# Patient Record
Sex: FEMALE | Race: WHITE | NOT HISPANIC OR LATINO | Employment: FULL TIME | URBAN - METROPOLITAN AREA
[De-identification: names, ages, dates, MRNs, and addresses within clinical notes are randomized per-mention and may not be internally consistent; named-entity substitution may affect disease eponyms.]

---

## 2023-10-04 ENCOUNTER — TREATMENT (OUTPATIENT)
Dept: OCCUPATIONAL THERAPY | Facility: CLINIC | Age: 63
End: 2023-10-04
Payer: COMMERCIAL

## 2023-10-04 DIAGNOSIS — S66.811A: Primary | ICD-10-CM

## 2023-10-04 DIAGNOSIS — M25.641 DECREASED RANGE OF MOTION OF FINGER OF RIGHT HAND: ICD-10-CM

## 2023-10-04 PROCEDURE — 97530 THERAPEUTIC ACTIVITIES: CPT | Mod: GO | Performed by: OCCUPATIONAL THERAPIST

## 2023-10-04 PROCEDURE — 97022 WHIRLPOOL THERAPY: CPT | Mod: GO | Performed by: OCCUPATIONAL THERAPIST

## 2023-10-04 PROCEDURE — 97140 MANUAL THERAPY 1/> REGIONS: CPT | Mod: GO | Performed by: OCCUPATIONAL THERAPIST

## 2023-10-04 ASSESSMENT — PAIN SCALES - GENERAL: PAINLEVEL_OUTOF10: 1

## 2023-10-04 ASSESSMENT — PAIN - FUNCTIONAL ASSESSMENT: PAIN_FUNCTIONAL_ASSESSMENT: 0-10

## 2023-10-04 NOTE — PROGRESS NOTES
Occupational Therapy    Occupational Therapy Treatment    Name: Anni Cifuentes  MRN: [id number]  : 1960  Date: 10/04/23       Assessment:  OT initiated BTE this visit with weakness and fatigue in right hand at completion of strengthening program.  No complaints of increased pain levels.     Plan:  Continue per plan of care.  Patient seeing physician next week for possible return to work determination.     Insurance: [employer]  Number of Allowed Visits: 40 PT/OT  Date Range: calendar year    Visit Number: 8     Subjective   General:  Patient reports she has been icing her hand for pain relief as she has been cleaning more at her house.     General  General Comment: no precautions  Pain Assessment:  Pain Assessment  Pain Assessment: 0-10  Pain Score: 1    TREATMENT:  Modalities 15 min  Fluidotherapy with AROM right hand  Manual Techniques 15 min  Joint traction and stretches to wrist and digits  Therapuetic Activity: 15 min   2 min   2 min   2 min   2 min   2 min    OP EDUCATION:  Educated patient in heat v ice, rest breaks with activity and continued use of extension splint.     Goals:    1. Patient to improve QuickDash score to at or below 20% for increased independence with ADLs.  2. Patient to improve AROM of IV and V digits to 250 deg for increased independence with ADLs.  3. Patient to improve right  strength to 30# to increase use of hand for lifting and carrying tasks.  4. Patient to improve resting max levels to 1/10.

## 2023-10-10 PROBLEM — M21.40 ACQUIRED PES PLANOVALGUS: Status: ACTIVE | Noted: 2023-10-10

## 2023-10-10 PROBLEM — D64.9 ANEMIA: Status: ACTIVE | Noted: 2023-10-10

## 2023-10-10 PROBLEM — E11.9 DIABETES (MULTI): Status: ACTIVE | Noted: 2023-10-10

## 2023-10-10 PROBLEM — M19.90 OSTEOARTHROSIS: Status: ACTIVE | Noted: 2023-10-10

## 2023-10-10 PROBLEM — F32.A DEPRESSION: Status: ACTIVE | Noted: 2023-10-10

## 2023-10-10 PROBLEM — K21.9 ESOPHAGEAL REFLUX: Status: ACTIVE | Noted: 2023-10-10

## 2023-10-10 PROBLEM — F41.1 ANXIETY STATE: Status: ACTIVE | Noted: 2023-10-10

## 2023-10-10 PROBLEM — J45.909 ASTHMA (HHS-HCC): Status: ACTIVE | Noted: 2023-10-10

## 2023-10-10 PROBLEM — I10 HIGH BLOOD PRESSURE: Status: ACTIVE | Noted: 2023-10-10

## 2023-10-10 PROBLEM — E07.9 THYROID DISEASE: Status: ACTIVE | Noted: 2023-10-10

## 2023-10-10 PROBLEM — M19.079 ARTHRITIS OF ANKLE OR FOOT, DEGENERATIVE: Status: ACTIVE | Noted: 2023-10-10

## 2023-10-10 PROBLEM — M19.90 ARTHRITIS: Status: ACTIVE | Noted: 2023-10-10

## 2023-10-10 PROBLEM — D36.9 BENIGN NEOPLASM: Status: ACTIVE | Noted: 2023-10-10

## 2023-10-10 PROBLEM — M12.579 TRAUMATIC ARTHROPATHY OF FOOT: Status: ACTIVE | Noted: 2023-10-10

## 2023-10-10 RX ORDER — FLUTICASONE PROPIONATE AND SALMETEROL 50; 250 UG/1; UG/1
1 POWDER RESPIRATORY (INHALATION) 2 TIMES DAILY
COMMUNITY

## 2023-10-10 RX ORDER — LEVOTHYROXINE SODIUM 100 UG/1
100 TABLET ORAL DAILY
COMMUNITY
Start: 2006-02-23

## 2023-10-10 RX ORDER — ALBUTEROL SULFATE 0.83 MG/ML
SOLUTION RESPIRATORY (INHALATION)
COMMUNITY
Start: 2006-03-09 | End: 2023-10-26 | Stop reason: WASHOUT

## 2023-10-10 RX ORDER — MULTIVITAMIN
1 TABLET ORAL DAILY
COMMUNITY
Start: 2006-02-23 | End: 2023-10-26 | Stop reason: WASHOUT

## 2023-10-10 RX ORDER — POTASSIUM CHLORIDE 750 MG/1
10 TABLET, FILM COATED, EXTENDED RELEASE ORAL 2 TIMES DAILY
COMMUNITY
Start: 2018-07-13 | End: 2023-10-26 | Stop reason: WASHOUT

## 2023-10-10 RX ORDER — FLUOXETINE 20 MG/1
TABLET ORAL
COMMUNITY
Start: 2006-02-23 | End: 2023-10-26 | Stop reason: WASHOUT

## 2023-10-10 RX ORDER — CYST/ALA/Q10/PHOS.SER/DHA/BROC 100-20-50
1 POWDER (GRAM) ORAL EVERY MORNING
COMMUNITY

## 2023-10-10 RX ORDER — OMEPRAZOLE 20 MG/1
20 CAPSULE, DELAYED RELEASE ORAL DAILY
COMMUNITY
Start: 2006-02-23

## 2023-10-10 RX ORDER — FLUTICASONE PROPIONATE 50 MCG
SPRAY, SUSPENSION (ML) NASAL
COMMUNITY
Start: 2006-02-23

## 2023-10-10 RX ORDER — BUPROPION HYDROCHLORIDE 75 MG/1
75 TABLET ORAL 2 TIMES DAILY
COMMUNITY
Start: 2023-06-06

## 2023-10-10 RX ORDER — FLUTICASONE PROPIONATE 220 UG/1
AEROSOL, METERED RESPIRATORY (INHALATION)
COMMUNITY
Start: 2006-02-23 | End: 2023-10-26 | Stop reason: WASHOUT

## 2023-10-10 RX ORDER — FLUOXETINE HYDROCHLORIDE 20 MG/1
20 CAPSULE ORAL EVERY MORNING
COMMUNITY
Start: 2022-07-25

## 2023-10-10 RX ORDER — FERROUS SULFATE 325(65) MG
325 TABLET ORAL
COMMUNITY
End: 2023-10-26 | Stop reason: WASHOUT

## 2023-10-10 RX ORDER — GABAPENTIN 300 MG/1
300 CAPSULE ORAL 2 TIMES DAILY
COMMUNITY
Start: 2019-03-22 | End: 2023-10-26 | Stop reason: WASHOUT

## 2023-10-10 RX ORDER — LORATADINE 10 MG/1
10 TABLET ORAL DAILY PRN
COMMUNITY
Start: 2006-02-23

## 2023-10-10 RX ORDER — LAMOTRIGINE 200 MG/1
200 TABLET ORAL
COMMUNITY
End: 2023-10-26 | Stop reason: WASHOUT

## 2023-10-10 RX ORDER — TRIAMTERENE AND HYDROCHLOROTHIAZIDE 37.5; 25 MG/1; MG/1
CAPSULE ORAL
COMMUNITY
Start: 2006-02-23 | End: 2023-10-26 | Stop reason: WASHOUT

## 2023-10-10 RX ORDER — OLMESARTAN MEDOXOMIL 20 MG/1
20 TABLET ORAL DAILY
COMMUNITY
Start: 2006-02-23 | End: 2023-10-26 | Stop reason: WASHOUT

## 2023-10-10 RX ORDER — ALBUTEROL SULFATE 90 UG/1
1 AEROSOL, METERED RESPIRATORY (INHALATION) DAILY PRN
COMMUNITY
Start: 2022-07-25

## 2023-10-10 RX ORDER — ASCORBIC ACID 500 MG
500 TABLET ORAL EVERY MORNING
COMMUNITY

## 2023-10-10 RX ORDER — LOSARTAN POTASSIUM AND HYDROCHLOROTHIAZIDE 12.5; 1 MG/1; MG/1
1 TABLET ORAL EVERY MORNING
COMMUNITY
Start: 2023-05-04

## 2023-10-11 ENCOUNTER — TREATMENT (OUTPATIENT)
Dept: OCCUPATIONAL THERAPY | Facility: CLINIC | Age: 63
End: 2023-10-11
Payer: COMMERCIAL

## 2023-10-11 DIAGNOSIS — S66.811A: ICD-10-CM

## 2023-10-11 DIAGNOSIS — M25.641 DECREASED RANGE OF MOTION OF FINGER OF RIGHT HAND: Primary | ICD-10-CM

## 2023-10-11 PROCEDURE — 97110 THERAPEUTIC EXERCISES: CPT | Mod: GO | Performed by: OCCUPATIONAL THERAPIST

## 2023-10-11 PROCEDURE — 97140 MANUAL THERAPY 1/> REGIONS: CPT | Mod: GO | Performed by: OCCUPATIONAL THERAPIST

## 2023-10-11 PROCEDURE — 97022 WHIRLPOOL THERAPY: CPT | Mod: GO | Performed by: OCCUPATIONAL THERAPIST

## 2023-10-11 ASSESSMENT — PAIN - FUNCTIONAL ASSESSMENT: PAIN_FUNCTIONAL_ASSESSMENT: 0-10

## 2023-10-11 ASSESSMENT — PAIN SCALES - GENERAL: PAINLEVEL_OUTOF10: 1

## 2023-10-11 NOTE — PROGRESS NOTES
Occupational Therapy    Occupational Therapy Treatment    Name: Anni Cifuentes  MRN: [id number]  : 1960  Date: 10/11/23  Time Calculation  Start Time: 1200  Stop Time: 1240  Time Calculation (min): 40 min    Assessment:  Patient has achieved all established therapy goals and reports minimal difficulty with home management tasks.  She plans to return to work next week.  Patient will be discharged from skilled OT at this time and continue with splint wear at night and strengthening program.   Plan:  Patient to be discharged to HEP and will continue with strengthening HEP.    Insurance: [facility]  Number of Allowed Visits: 40 PT/OT  Date Range:      Visit Number: 9    Subjective   General:  Patient reports she sees Dr. Cruz on Monday and plans to go back to work on Tuesday.  She would like to make today her last therapy visit.   General  General Comment: no precautions  Pain Assessment:  Pain Assessment  Pain Assessment: 0-10  Pain Score: 1    OP EDUCATION:   HEP update to putty strengthening program.    TREATMENT:  Modalities 15 min  Fluidotherapy with AROM right hand  Manual Techniques 10 min  Joint traction and stretches to wrist and digits  Therapuetic Exercise: 15 min  Objective measures of AROM, strength  Green/white putty mix- , pinch and hand abd    GOALS:  1. Patient to improve QuickDash score to at or below 20% for increased independence with ADLs. Goal achieved 10/11/23  2. Patient to improve AROM of IV and V digits to 250 deg for increased independence with ADLs. Goal achieved 10/11/23  3. Patient to improve right  strength to 30# to increase use of hand for lifting and carrying tasks. Goal achieved 10/11/23  4. Patient to improve resting max levels to 1/10. Goal achieved 10/11/23

## 2023-10-18 ENCOUNTER — APPOINTMENT (OUTPATIENT)
Dept: OCCUPATIONAL THERAPY | Facility: CLINIC | Age: 63
End: 2023-10-18
Payer: COMMERCIAL

## 2023-10-27 ENCOUNTER — HOSPITAL ENCOUNTER (OUTPATIENT)
Facility: HOSPITAL | Age: 63
Setting detail: OUTPATIENT SURGERY
Discharge: HOME | End: 2023-10-27
Attending: PODIATRIST | Admitting: PODIATRIST
Payer: COMMERCIAL

## 2023-10-27 VITALS
HEART RATE: 81 BPM | DIASTOLIC BLOOD PRESSURE: 85 MMHG | TEMPERATURE: 97.2 F | RESPIRATION RATE: 17 BRPM | SYSTOLIC BLOOD PRESSURE: 141 MMHG | OXYGEN SATURATION: 99 %

## 2023-10-27 DIAGNOSIS — S82.842A ANKLE FRACTURE, BIMALLEOLAR, CLOSED, LEFT, INITIAL ENCOUNTER: ICD-10-CM

## 2023-10-27 PROCEDURE — 7100000010 HC PHASE TWO TIME - EACH INCREMENTAL 1 MINUTE: Performed by: PODIATRIST

## 2023-10-27 PROCEDURE — 88300 SURGICAL PATH GROSS: CPT | Performed by: PATHOLOGY

## 2023-10-27 PROCEDURE — 88300 SURGICAL PATH GROSS: CPT | Mod: TC | Performed by: PODIATRIST

## 2023-10-27 PROCEDURE — 2500000005 HC RX 250 GENERAL PHARMACY W/O HCPCS: Performed by: PODIATRIST

## 2023-10-27 PROCEDURE — 3600000009 HC OR TIME - EACH INCREMENTAL 1 MINUTE - PROCEDURE LEVEL FOUR: Performed by: PODIATRIST

## 2023-10-27 PROCEDURE — 7100000009 HC PHASE TWO TIME - INITIAL BASE CHARGE: Performed by: PODIATRIST

## 2023-10-27 PROCEDURE — 3600000004 HC OR TIME - INITIAL BASE CHARGE - PROCEDURE LEVEL FOUR: Performed by: PODIATRIST

## 2023-10-27 RX ORDER — SODIUM CHLORIDE, SODIUM LACTATE, POTASSIUM CHLORIDE, CALCIUM CHLORIDE 600; 310; 30; 20 MG/100ML; MG/100ML; MG/100ML; MG/100ML
75 INJECTION, SOLUTION INTRAVENOUS CONTINUOUS
Status: DISCONTINUED | OUTPATIENT
Start: 2023-10-27 | End: 2023-10-27

## 2023-10-27 RX ORDER — BUPIVACAINE HYDROCHLORIDE 5 MG/ML
INJECTION, SOLUTION PERINEURAL AS NEEDED
Status: DISCONTINUED | OUTPATIENT
Start: 2023-10-27 | End: 2023-10-27 | Stop reason: HOSPADM

## 2023-10-27 ASSESSMENT — PAIN SCALES - GENERAL
PAINLEVEL_OUTOF10: 0 - NO PAIN
PAINLEVEL_OUTOF10: 0 - NO PAIN

## 2023-10-27 ASSESSMENT — PAIN - FUNCTIONAL ASSESSMENT
PAIN_FUNCTIONAL_ASSESSMENT: 0-10
PAIN_FUNCTIONAL_ASSESSMENT: 0-10

## 2023-10-27 ASSESSMENT — COLUMBIA-SUICIDE SEVERITY RATING SCALE - C-SSRS
2. HAVE YOU ACTUALLY HAD ANY THOUGHTS OF KILLING YOURSELF?: NO
6. HAVE YOU EVER DONE ANYTHING, STARTED TO DO ANYTHING, OR PREPARED TO DO ANYTHING TO END YOUR LIFE?: NO
1. IN THE PAST MONTH, HAVE YOU WISHED YOU WERE DEAD OR WISHED YOU COULD GO TO SLEEP AND NOT WAKE UP?: NO

## 2023-10-27 NOTE — H&P (VIEW-ONLY)
Formatting of this note is different from the original.  Chief Complaint   Patient presents with    Right Hand - Post-op     Right hand tendon transfer 8/10/23     HPI   The patient reports overall doing well in regards to her small finger tendon transfer.  She reports having a slight lag at end of the day.  She also continues to complain of significant left cubital tunnel symptoms.  Past Medical History:   Diagnosis Date    Anemia     Ankle dislocation 1987    Asthma (CMS/HCC)     CTS (carpal tunnel syndrome)     Depression (CMS/[facility])     DM (diabetes mellitus) (CMS/[facility])     Fracture of ankle 1987    Heart burn     HTN (hypertension) (CMS/[facility])     OA (osteoarthritis)     Scoliosis 2015    Thyroid disease (CMS/[facility])      Past Surgical History:   Procedure Laterality Date    ANKLE FRACTURE SURGERY  1987    CARPAL TUNNEL RELEASE  2000    IR VENOGRAM EPIDURAL  08/01/2018    IR VENOGRAM EPIDURAL [facility]     IR VENOGRAM EPIDURAL  08/14/2018    IR VENOGRAM EPIDURAL [facility]     IR VENOGRAM EPIDURAL  08/21/2018    IR VENOGRAM EPIDURAL [facility]     KNEE SURGERY  2007    tumors removed    ORIF ANKLE FRACTURE  1989    [place] PARTIAL HIP REPLACEMENT Right 2019    TENDON TRANSFER Right 08/10/2023    Small to Ring extensor tendon - Dr. Cruz    WRIST SURGERY Right 03/29/2018     Current Outpatient Medications   Medication Instructions    Advair Diskus 250-50 MCG/ACT aerosol powder  1 puff, Inhalation, 2 times daily    albuterol HFA 90 mcg/act inhaler 1 puff, Inhalation, Daily PRN    ascorbic acid (VITAMIN C) 500 mg, Oral, Daily    Barberry-Oreg Grape-Goldenseal (Berberine Complex) 200-200-50 MG capsule 1 tablet, Oral, Daily    buPROPion (WELLBUTRIN) 75 mg, Oral, 2 times daily    FLUoxetine (PROZAC) 20 mg, Oral, Daily    losartan-hydroCHLOROthiazide (Hyzaar) 100-12.5 MG tablet 1 tablet, Oral, Daily    Lysine 500 MG capsule 1 tablet, Oral, Daily    omeprazole (PRILOSEC) 20 mg, Oral, Every 24 hours    Synthroid  100 mcg, Oral, Every 24 hours     Allergies   Allergen Reactions    Nsaids Anaphylaxis    Salicylates Shortness of breath and Unknown     SEVERE ASTHMA    Penicillins Other     Lips swell    Sulfa Antibiotics Other     Lips swell     Family History   Problem Relation Name Age of Onset    Heart disease Mother Sue     Hypertension Mother Sue     Diabetes Mother Sue     Diabetes Father Juan Carlos     Hypertension Father Juan Carlos     Heart disease Father Juan Carlos     Stroke Father Juan Carlos     Diabetes Brother Gil     Scoliosis Mother's Sister Fina      Social History     Socioeconomic History    Marital status: Unmarried     Spouse name: Not on file    Number of children: Not on file    Years of education: Not on file    Highest education level: Not on file   Occupational History    Not on file   Tobacco Use    Smoking status: Never    Smokeless tobacco: Never   Vaping Use    Vaping status: Not on file   Substance and Sexual Activity    Alcohol use: Not Currently     Comment: caffeine 1-2 cups per day; drinks alcohol monhtly or less    Drug use: Never    Sexual activity: Not Currently     Birth control/protection: None   Other Topics Concern    Not on file   Social History Narrative    Not on file     Social Determinants of Health     Financial Resource Strain: Not on file   Food Insecurity: Not on file   Transportation Needs: Not on file   Physical Activity: Not on file   Stress: Not on file   Social Connections: Not on file   Intimate Partner Violence: Not on file   Housing Stability: Not on file     Vitals:    10/16/23 1304   Temp: 96.8 °F       Physical Exam    Inspection of the right upper extremity reveals well-healed incision over the dorsal hand.  She is able to fully flex and extend the small finger.  Small finger extension strength is grossly 5/5.  Inspection left upper extremity reveals no obvious atrophy.  There is significant weakness in the intrinsics.  She has a positive Tinel's and ulnar nerve  compression test.  Sensation to light touch is diminished along the ulnar nerve distribution.  Assessment and Plan   Diagnosis Plan   1. Postoperative visit       2. Extensor tendon rupture of hand, right, subsequent encounter       3. Cubital tunnel syndrome on left         The patient is now 10 weeks status her right small finger extensor tendon transfer.  Overall, she is doing well.  She will continue with a home exercise program and return to work at this time.    Presents with left cubital tunnel syndrome. I discussed with patient the diagnosis and treatment options including operative and nonoperative. The patient would like to proceed with surgery. We reviewed the surgical procedure, postoperative protocol and risks and benefits. The risks include but are not limited to the following: infection, pain, stiffness, loss of function, damage to surrounding structures, return of preoperative symptoms or symptoms no better, the need for repeat operative interventions and the risks of anesthesia. The patient understands and accepts these risks. All the patient's questions were answered. The plan is to perform an in-situ release versus an anterior ulnar nerve transposition. . The patient will schedule surgery at their earliest convenience  Before the end of the year..          Follow up for Post-Op.   Electronically signed by Issa Cruz MD at 10/16/2023  1:25 PM EDT

## 2023-10-27 NOTE — DISCHARGE INSTRUCTIONS
Patient to keep dressing clean, dry, intact. Please rest today. Partial weightbearing to the forefoot only to the left foot (ball of the foot, avoiding heel) in surgical shoe. Rest, ice behind the knee, elevate left foot as much as possible.

## 2023-10-27 NOTE — BRIEF OP NOTE
Date: 10/27/2023  OR Location: [facility] OR    Name: Anni Cifuentes, : 1960, Age: 63 y.o., MRN: [id number], Sex: female    Diagnosis  Painful hardware left foot, T84.84XA Painful hardware left foot, T84.84XA     Procedures  LEFT LOWER EXTREMITY HARDWARE REMOVAL WITH C-ARM  28443 - IA REMOVAL IMPLANT DEEP      Surgeons      * Armando Putnam - Primary    Resident/Fellow/Other Assistant:  Surgeon(s) and Role: (1) Darrell Hollins, TIM/PGY-3, (2) Santiago Murrieta, TIM/PGY-2    Procedure Summary  Anesthesia: * No anesthesia type entered *  ASA: ASA status not filed in the log.  Anesthesia Staff: No anesthesia staff entered.  Estimated Blood Loss: <5mL  Intra-op Medications: * No intraprocedure medications in log *        Specimen:   ID Type Source Tests Collected by Time   1 : Retain Internal Fixation Tissue HARDWARE SURGICAL PATHOLOGY EXAM Armando Putnam DPM 10/27/2023 7144        Staff:   Circulator: Sasha Monreal RN; Mariangel Bowers RN  Relief Scrub: Kirsten Chavez  Scrub Person: Emelyn Gayle RN          Findings: cannulated screw    Complications:  None; patient tolerated the procedure well.     Disposition: PACU - hemodynamically stable.  Condition: stable  Specimens Collected:   ID Type Source Tests Collected by Time   1 : Retain Internal Fixation Tissue HARDWARE SURGICAL PATHOLOGY EXAM Armando Putnam DPM 10/27/2023 1533     Attending Attestation: I was present for the entire procedure.    Armando Putnam  Phone Number: [phone]

## 2023-10-29 NOTE — OP NOTE
LEFT LOWER EXTREMITY HARDWARE REMOVAL WITH C-ARM (L) Operative Note     Date: 10/27/2023  OR Location: [facility] OR    Name: Anni Cifuentes, : 1960, Age: 63 y.o., MRN: [id number], Sex: female    Diagnosis  Left foot painful retained hardware  Left foot pain Post-op Diagnosis     * S/P foot surgery, left [Z98.890]  Left foot foreign body     Procedures  LEFT LOWER EXTREMITY HARDWARE REMOVAL WITH C-ARM  38590 - DE REMOVAL IMPLANT DEEP      Surgeons      * Armando Putnam - Primary    Resident/Fellow/Other Assistant:  Surgeon(s) and Role:    Procedure Summary  Anesthesia: * No anesthesia type entered *  ASA: ASA status not filed in the log.  Anesthesia Staff: No anesthesia staff entered.  Estimated Blood Loss: 5mL  Intra-op Medications: * No intraprocedure medications in log *           Anesthesia Record               Intraprocedure I/O Totals       None           Specimen:   ID Type Source Tests Collected by Time   1 : Retain Internal Fixation Tissue HARDWARE SURGICAL PATHOLOGY EXAM Armando Putnam, DPM 10/27/2023 1536        Staff:   Circulator: Sasha Monreal RN; Mariangel Bowers RN  Relief Scrub: Kirsten Chavez  Scrub Person: Emelyn Gayel RN         Drains and/or Catheters: * None in log *    Tourniquet Times:   * Missing tourniquet times found for documented tourniquets in lo *     Implants:     Findings: Broken  head    Indications: Anni Cifuentes is an 63 y.o. female who is having surgery for painful retained hardware. Pt has had pain to the posterior left heel for a couple months. She states that there is pain to the area when pressure is applied to the area. Pt has no other pedal complaints and denies constitutional symptoms    The patient was seen in the preoperative area. The risks, benefits, complications, treatment options, non-operative alternatives, expected recovery and outcomes were discussed with the patient. The possibilities of reaction to medication, pulmonary  aspiration, injury to surrounding structures, bleeding, recurrent infection, the need for additional procedures, failure to diagnose a condition, and creating a complication requiring transfusion or operation were discussed with the patient. The patient concurred with the proposed plan, giving informed consent.  The site of surgery was properly noted/marked if necessary per policy. The patient has been actively warmed in preoperative area. Preoperative antibiotics are not indicated. Venous thrombosis prophylaxis are not indicated.    Procedure Details:     The patient was brought to the operating room and positioned supine on the operating room table.  A time-out was performed, identifying the patient by name, medical record number, date of birth, site of procedure, and procedure to be performed. Local anesthesia was administered by the podiatry team to the posterior left heel using 10 ml's 0.5% marcaine in a V-block fashion and it was ensured that proper analgesia was achieved. All bony prominences were well padded. Patient was then secured with safety straps.  Attention was drawn to the Left lower extremity. The area was then prepped and draped in the usual sterile fashion.     Attention was directed to the left posterior heel where a partial thickness ulcer measuring 0.7x0.5x0.1 cm was noted with a noticeable palpable mass. Upon reviewing preoperative XR, the mass is the unidentified retained hardware. A skin marker was used to map out the incision. The incision plan was made to effectively excise the ulceration in an ellipsoid fashion along the resting skin tension lines. The transverse incision was made to the layer of epidermis and dermis to the layer of subcutaneous tissue and the ulceration noted above was successfully excised without incident. The defect measured 0.7x0.5x0.1. Upon excision of ulceration, iris scissors was utilized to locate the painful retained hardware and was extracted using a rongeur.  Care was taken to avoid excessive resection of soft tissue within the surgical site. The painful retained hardware appeared to be a broken piece of a surgical screw  head thus classifying it as a foreign body. The surgical incision site and ulceration excision defect was primarily closed with 2-0 prolene full thickness. A well padded multilayered Aguilar compression dressing was applied to the left lower extremity without incident. No tourniquet was utilized during the entirety of this procedure.    Complications:   None     Disposition: PACU - hemodynamically stable.  Condition: stable         Additional Details: Foreign body (broken surgical screwdriver head from previous surgery)    Attending Attestation:     Dictation performed by Darrell Hollins DPM PGY3 and discussed and reviewed with Dr. Armando Reyes

## 2023-10-30 LAB
LABORATORY COMMENT REPORT: NORMAL
PATH REPORT.FINAL DX SPEC: NORMAL
PATH REPORT.GROSS SPEC: NORMAL
PATH REPORT.RELEVANT HX SPEC: NORMAL
PATH REPORT.TOTAL CANCER: NORMAL

## (undated) DEVICE — Device

## (undated) DEVICE — BAG, BANDED, 36IN X 28IN